# Patient Record
Sex: FEMALE | Race: BLACK OR AFRICAN AMERICAN | Employment: FULL TIME | URBAN - METROPOLITAN AREA
[De-identification: names, ages, dates, MRNs, and addresses within clinical notes are randomized per-mention and may not be internally consistent; named-entity substitution may affect disease eponyms.]

---

## 2017-08-21 RX ORDER — OMEPRAZOLE 20 MG/1
CAPSULE, DELAYED RELEASE ORAL
Qty: 30 CAP | Refills: 0 | Status: SHIPPED | OUTPATIENT
Start: 2017-08-21 | End: 2019-10-17

## 2017-08-22 NOTE — TELEPHONE ENCOUNTER
Received omeprazole refill request from pharmacy for pt who is 3.3y s/p sleeve. Was last seen in office @ her 2.5y check on 10/24/16. No future appts on our schedule. PLAN:  Will refill this one time. Will have staff contact pt to make OV for this fall.

## 2018-08-16 ENCOUNTER — DOCUMENTATION ONLY (OUTPATIENT)
Dept: SURGERY | Age: 53
End: 2018-08-16

## 2018-10-03 ENCOUNTER — OFFICE VISIT (OUTPATIENT)
Dept: SURGERY | Age: 53
End: 2018-10-03

## 2018-10-03 VITALS
DIASTOLIC BLOOD PRESSURE: 80 MMHG | HEIGHT: 63 IN | TEMPERATURE: 98 F | SYSTOLIC BLOOD PRESSURE: 130 MMHG | OXYGEN SATURATION: 99 % | BODY MASS INDEX: 34.98 KG/M2 | WEIGHT: 197.4 LBS | RESPIRATION RATE: 16 BRPM | HEART RATE: 66 BPM

## 2018-10-03 DIAGNOSIS — K90.9 INTESTINAL MALABSORPTION, UNSPECIFIED TYPE: ICD-10-CM

## 2018-10-03 DIAGNOSIS — Z98.84 S/P BARIATRIC SURGERY: ICD-10-CM

## 2018-10-03 DIAGNOSIS — R63.5 WEIGHT GAIN: Primary | ICD-10-CM

## 2018-10-03 RX ORDER — TOPIRAMATE 50 MG/1
TABLET, FILM COATED ORAL
Refills: 0 | COMMUNITY
Start: 2018-09-17

## 2018-10-03 RX ORDER — TRAZODONE HYDROCHLORIDE 50 MG/1
TABLET ORAL
Refills: 0 | COMMUNITY
Start: 2018-09-17

## 2018-10-03 NOTE — MR AVS SNAPSHOT
Rigoberto Pock 
 
 
 [address] 
[phone] Patient: Leandro Andrews MRN: [id number] :1965 Visit Information Date & Time Provider Department Dept. Phone Encounter #  
 10/3/2018 10:15 AM MD Julee Stuart [facility] [place] [phone] Your Appointments 10/3/2019  9:30 AM  
Office Visit with TOMA Love [facility] [place] ([address]) Appt Note: Yearly [address] [facility] [address] [place] [address]  
  
   
 [address] [place] [address] Upcoming Health Maintenance Date Due Hepatitis C Screening 1965 Pneumococcal 19-64 Medium Risk (1 of 1 - PPSV23) 12/3/1982 DTaP/Tdap/Td series (1 - Tdap) 12/3/1984 Shingrix Vaccine Age 50> (1 of 2) 12/3/2013 FOBT Q 1 YEAR AGE 50-75 12/3/2013 BREAST CANCER SCRN MAMMOGRAM 1/10/2015 PAP AKA CERVICAL CYTOLOGY 1/10/2016 Influenza Age 5 to Adult 2018 Allergies as of 10/3/2018  Review Complete On: 10/24/2016 By: Demi Garcia MD  
 No Known Allergies Current Immunizations  Never Reviewed No immunizations on file. Not reviewed this visit You Were Diagnosed With   
  
 Codes Comments Weight gain    -  Primary ICD-10-CM: R63.5 ICD-9-CM: 783.1 Intestinal malabsorption, unspecified type     ICD-10-CM: K90.9 ICD-9-CM: 579.9 S/P bariatric surgery     ICD-10-CM: Z98.84 ICD-9-CM: V45.86 Vitals BP Pulse Temp Resp Height(growth percentile) Weight(growth percentile) 130/80 (BP 1 Location: Right arm, BP Patient Position: Sitting) 66 98 °F (36.7 °C) 16 5' 3\" (1.6 m) 197 lb 6.4 oz (89.5 kg) SpO2 BMI OB Status Smoking Status 99% 34.97 kg/m2 Postmenopausal Never Smoker Vitals History BMI and BSA Data Body Mass Index Body Surface Area 34.97 kg/m 2 1.99 m 2 Preferred Pharmacy Pharmacy Name Phone [address] [facility], [address] [facility] [phone] Your Updated Medication List  
  
   
This list is accurate as of 10/3/18 11:01 AM.  Always use your most recent med list.  
  
  
  
  
 b complex vitamins tablet Take 1 Tab by mouth daily. Biotin 2,500 mcg Cap Take  by mouth. CALCIUM CITRATE PO Take  by mouth. docusate sodium 100 mg capsule Commonly known as:  Lender Comes Take 100 mg by mouth daily. multivitamin with iron chewable tablet Commonly known as:  Faylene Lily Take 1 Tab by mouth daily. * omeprazole 40 mg capsule Commonly known as:  PRILOSEC  
TAKE 1 CAPSULE BY MOUTH DAILY  
  
 * omeprazole 20 mg capsule Commonly known as:  PRILOSEC  
TAKE ONE CAPSULE BY MOUTH EVERY DAY  
  
 PROAIR HFA 90 mcg/actuation inhaler Generic drug:  albuterol Take 2 Puffs by inhalation every four (4) hours as needed. topiramate 50 mg tablet Commonly known as:  TOPAMAX TK 1 T PO  QD  
  
 traZODone 50 mg tablet Commonly known as:  DESYREL  
TK 1 T PO  QD  
  
 VITAMIN B-12 1,000 mcg sublingual tablet Generic drug:  cyanocobalamin Take 1,000 mcg by mouth daily. VITAMIN D3 PO Take  by mouth. * Notice: This list has 2 medication(s) that are the same as other medications prescribed for you. Read the directions carefully, and ask your doctor or other care provider to review them with you. To-Do List   
 10/03/2018 Lab:  CBC WITH AUTOMATED DIFF   
  
 10/03/2018 Lab:  FERRITIN   
  
 10/03/2018 Lab:  FOLATE   
  
 10/03/2018 Lab:  IRON   
  
 10/03/2018 Lab:  METABOLIC PANEL, COMPREHENSIVE   
  
 10/03/2018 Lab:  TSH 3RD GENERATION   
  
 10/03/2018 Lab:  VITAMIN B1, WHOLE BLOOD   
  
 10/03/2018 Lab:  VITAMIN B12   
  
 10/03/2018 Lab:  VITAMIN D, 25 HYDROXY Patient Instructions Body Mass Index: Care Instructions Your Care Instructions Body mass index (BMI) can help you see if your weight is raising your risk for health problems. It uses a formula to compare how much you weigh with how tall you are. · A BMI lower than 18.5 is considered underweight. · A BMI between 18.5 and 24.9 is considered healthy. · A BMI between 25 and 29.9 is considered overweight. A BMI of 30 or higher is considered obese. If your BMI is in the normal range, it means that you have a lower risk for weight-related health problems. If your BMI is in the overweight or obese range, you may be at increased risk for weight-related health problems, such as high blood pressure, heart disease, stroke, arthritis or joint pain, and diabetes. If your BMI is in the underweight range, you may be at increased risk for health problems such as fatigue, lower protection (immunity) against illness, muscle loss, bone loss, hair loss, and hormone problems. BMI is just one measure of your risk for weight-related health problems. You may be at higher risk for health problems if you are not active, you eat an unhealthy diet, or you drink too much alcohol or use tobacco products. Follow-up care is a key part of your treatment and safety. Be sure to make and go to all appointments, and call your doctor if you are having problems. It's also a good idea to know your test results and keep a list of the medicines you take. How can you care for yourself at home? · Practice healthy eating habits. This includes eating plenty of fruits, vegetables, whole grains, lean protein, and low-fat dairy. · If your doctor recommends it, get more exercise. Walking is a good choice. Bit by bit, increase the amount you walk every day. Try for at least 30 minutes on most days of the week. · Do not smoke. Smoking can increase your risk for health problems.  If you need help quitting, talk to your doctor about stop-smoking programs and medicines. These can increase your chances of quitting for good. · Limit alcohol to 2 drinks a day for men and 1 drink a day for women. Too much alcohol can cause health problems. If you have a BMI higher than 25 · Your doctor may do other tests to check your risk for weight-related health problems. This may include measuring the distance around your waist. A waist measurement of more than 40 inches in men or 35 inches in women can increase the risk of weight-related health problems. · Talk with your doctor about steps you can take to stay healthy or improve your health. You may need to make lifestyle changes to lose weight and stay healthy, such as changing your diet and getting regular exercise. If you have a BMI lower than 18.5 · Your doctor may do other tests to check your risk for health problems. · Talk with your doctor about steps you can take to stay healthy or improve your health. You may need to make lifestyle changes to gain or maintain weight and stay healthy, such as getting more healthy foods in your diet and doing exercises to build muscle. Where can you learn more? Go to http://makenna-rafat.info/. Enter S176 in the search box to learn more about \"Body Mass Index: Care Instructions. \" Current as of: October 9, 2017 Content Version: 11.7 © 1421-6770 [employer]. Care instructions adapted under license by [employer] (which disclaims liability or warranty for this information). If you have questions about a medical condition or this instruction, always ask your healthcare professional. [place] [address] any warranty or liability for your use of this information. Introducing [facility]! [facility] introduces Digital Caddies patient portal. Now you can access parts of your medical record, email your doctor's office, and request medication refills online. 1. In your internet browser, go to [email or web address] 2. Click on the First Time User? Click Here link in the Sign In box. You will see the New Member Sign Up page. 3. Enter your [facility] Access Code exactly as it appears below. You will not need to use this code after youve completed the sign-up process. If you do not sign up before the expiration date, you must request a new code. · [facility] Access Code: [email or web address] Expires: 1/1/2019 10:42 AM 
 
4. Enter the last four digits of your Social Security Number (xxxx) and Date of Birth (mm/dd/yyyy) as indicated and click Submit. You will be taken to the next sign-up page. 5. Create a HouzeMet ID. This will be your [facility] login ID and cannot be changed, so think of one that is secure and easy to remember. 6. Create a [facility] password. You can change your password at any time. 7. Enter your Password Reset Question and Answer. This can be used at a later time if you forget your password. 8. Enter your e-mail address. You will receive e-mail notification when new information is available in [address] 9. Click Sign Up. You can now view and download portions of your medical record. 10. Click the Download Summary menu link to download a portable copy of your medical information. If you have questions, please visit the Frequently Asked Questions section of the [facility] website. Remember, [facility] is NOT to be used for urgent needs. For medical emergencies, dial 911. Now available from your iPhone and Android! Please provide this summary of care documentation to your next provider. Your primary care clinician is listed as Nguyen Law. If you have any questions after today's visit, please call [phone].

## 2018-10-03 NOTE — PROGRESS NOTES
4.5 years follow-up Subjective: Leoma Leventhal  is a 45 y.o. female who presents for follow-up about 4.5 years following laparoscopic sleeve gastrectomy. She has lost a total of 38 pounds since surgery. Body mass index is 34.97 kg/(m^2). . EBWL is (35%). The patient presents today to assess their progress toward their goal of weight loss and to address any issues that may be present. Today the patient and I have reviewed their diet and how appropriate their food choices are. The following issues have been identified  - 14 lbs regained over the past 2 years. Pain assessment - 0/10 Ayse Ricks Surgery related complication: NA She reports no real issues and denies vomiting, abdominal pain, diarrhea and difficulty breathing. The patient's exercise level: not active. Changes in her medical history and medications have been reviewed. Patient Active Problem List  
Diagnosis Code  RAD (reactive airway disease) J45.909  Asthma J45.909  
 History of breast cancer Z85.3  Intestinal malabsorption K90.9  
 H. pylori infection A04.8  Chronic gastritis K29.50  S/P bariatric surgery Z98.84 Past Medical History:  
Diagnosis Date  Asthma  Cancer ([address] breast  
 H. pylori infection   
 treated  History of breast cancer 2009  
 left with subsequent mastectomy-2009  Hypertension 6yrs  Morbid obesity ([address]  Nausea & vomiting  Status post bariatric surgery  Unspecified intestinal malabsorption Past Surgical History:  
Procedure Laterality Date  ABDOMEN SURGERY PROC UNLISTED    
 liposuction  BREAST SURGERY PROCEDURE UNLISTED    
 left mastectomy /abdominoplasty, and reconstruction  HX BREAST BIOPSY    
 multiple  HX BREAST RECONSTRUCTION    
 HX GYN    
 BTL  
 HX LIPOSUCTION    
 HX OTHER SURGICAL    
 multiple biopsies for breast cancer  MT LAP, MACY RESTRICT PROC, LONGITUDINAL GASTRECTOMY    
 sleeve resection Current Outpatient Prescriptions Medication Sig Dispense Refill  topiramate (TOPAMAX) 50 mg tablet TK 1 T PO  QD  0  
 traZODone (DESYREL) 50 mg tablet TK 1 T PO  QD  0  
 omeprazole (PRILOSEC) 20 mg capsule TAKE ONE CAPSULE BY MOUTH EVERY DAY 30 Cap 0  
 omeprazole (PRILOSEC) 40 mg capsule TAKE 1 CAPSULE BY MOUTH DAILY 30 Cap 0  
 docusate sodium (COLACE) 100 mg capsule Take 100 mg by mouth daily.  b complex vitamins tablet Take 1 Tab by mouth daily.  CALCIUM CITRATE PO Take  by mouth.  CHOLECALCIFEROL, VITAMIN D3, (VITAMIN D3 PO) Take  by mouth.  cyanocobalamin (VITAMIN B-12) 1,000 mcg sublingual tablet Take 1,000 mcg by mouth daily.  Biotin 2,500 mcg cap Take  by mouth.  multivitamin with iron (FLINTSTONES) chewable tablet Take 1 Tab by mouth daily.  albuterol (PROAIR HFA) 90 mcg/actuation inhaler Take 2 Puffs by inhalation every four (4) hours as needed. Review of Symptoms:  
 
General - No history or complaints of unexpected fever or chills Head/Neck - No history or complaints of headache or dizziness Cardiac - No history or complaints of chest pain, palpitations, or shortness of breath Pulmonary - No history or complaints of shortness of breath or productive cough Gastrointestinal - as noted above Genitourinary - No history or complaints of hematuria/dysuria or renal lithiasis Musculoskeletal - No history or complaints of joint  muscular weakness Hematologic - No history of any bleeding episodes Neurologic - No history or complaints of  migraine headaches or neurologic symptoms Objective:  
 
Visit Vitals  /80 (BP 1 Location: Right arm, BP Patient Position: Sitting)  Pulse 66  Temp 98 °F (36.7 °C)  Resp 16  
 Ht 5' 3\" (1.6 m)  Wt 89.5 kg (197 lb 6.4 oz)  SpO2 99%  BMI 34.97 kg/m2 Physical Exam: 
 
General:  alert, cooperative, no distress, appears stated age Lungs:   clear to auscultation bilaterally Heart:  Regular rate and rhythm Abdomen:   abdomen is soft without significant tenderness, masses, organomegaly or guarding; Incisions: healing well, no significant drainage Labs:  
 
No results found for this or any previous visit (from the past 2016 hour(s)). Assessment:  
 
1. History of Morbid obesity, status post  laparoscopic sleeve gastrectomy. The patient is making some dietary mistakes and is eating too close to bedtime. We had a long discussion today about sleeve resection and vitamin needs. He will consider meeting with dietary staff in an effort to better control carb intake. The patient's exercise also needs improvement. She does describe adequate portion size with her sleeve. TSH ordered today Plan: 1. Remember to measure portions, continue low carbohydrate diet 2. Reviewed labs and appropriate changes made to vitamin regimen 3. Remember vitamin supplements. 4. Exercise regimen appears inadequate. 5. Attend support group 6. Follow-up in 1 year(s). 7. The patient understands the plan of action 8. Total time spent with the patient 30 minutes.

## 2018-10-03 NOTE — PATIENT INSTRUCTIONS
Body Mass Index: Care Instructions Your Care Instructions Body mass index (BMI) can help you see if your weight is raising your risk for health problems. It uses a formula to compare how much you weigh with how tall you are. · A BMI lower than 18.5 is considered underweight. · A BMI between 18.5 and 24.9 is considered healthy. · A BMI between 25 and 29.9 is considered overweight. A BMI of 30 or higher is considered obese. If your BMI is in the normal range, it means that you have a lower risk for weight-related health problems. If your BMI is in the overweight or obese range, you may be at increased risk for weight-related health problems, such as high blood pressure, heart disease, stroke, arthritis or joint pain, and diabetes. If your BMI is in the underweight range, you may be at increased risk for health problems such as fatigue, lower protection (immunity) against illness, muscle loss, bone loss, hair loss, and hormone problems. BMI is just one measure of your risk for weight-related health problems. You may be at higher risk for health problems if you are not active, you eat an unhealthy diet, or you drink too much alcohol or use tobacco products. Follow-up care is a key part of your treatment and safety. Be sure to make and go to all appointments, and call your doctor if you are having problems. It's also a good idea to know your test results and keep a list of the medicines you take. How can you care for yourself at home? · Practice healthy eating habits. This includes eating plenty of fruits, vegetables, whole grains, lean protein, and low-fat dairy. · If your doctor recommends it, get more exercise. Walking is a good choice. Bit by bit, increase the amount you walk every day. Try for at least 30 minutes on most days of the week. · Do not smoke. Smoking can increase your risk for health problems.  If you need help quitting, talk to your doctor about stop-smoking programs and medicines. These can increase your chances of quitting for good. · Limit alcohol to 2 drinks a day for men and 1 drink a day for women. Too much alcohol can cause health problems. If you have a BMI higher than 25 · Your doctor may do other tests to check your risk for weight-related health problems. This may include measuring the distance around your waist. A waist measurement of more than 40 inches in men or 35 inches in women can increase the risk of weight-related health problems. · Talk with your doctor about steps you can take to stay healthy or improve your health. You may need to make lifestyle changes to lose weight and stay healthy, such as changing your diet and getting regular exercise. If you have a BMI lower than 18.5 · Your doctor may do other tests to check your risk for health problems. · Talk with your doctor about steps you can take to stay healthy or improve your health. You may need to make lifestyle changes to gain or maintain weight and stay healthy, such as getting more healthy foods in your diet and doing exercises to build muscle. Where can you learn more? Go to http://makenna-rafat.info/. Enter S176 in the search box to learn more about \"Body Mass Index: Care Instructions. \" Current as of: October 9, 2017 Content Version: 11.7 © 9320-5866 [employer], Incorporated. Care instructions adapted under license by [employer] (which disclaims liability or warranty for this information). If you have questions about a medical condition or this instruction, always ask your healthcare professional. [place] [address] any warranty or liability for your use of this information. Patient Instructions 1. Continue to monitor carbohydrate and protein intake- remember to keep your           total  carbohydrates to 50 grams or less per day for best results. 2. Remember hydration goals - usually 48 to 64 ounces of liquids per day 3. Continue to work towards exercise goals - minimum 3 days per week of 45          minutes to  1 hour at a time. 4. Remember to take vitamins as directed Supplement Resource Guide Importance of Protein:  
Maintains lean body mass, produces antibodies to fight off infections, heals wounds, minimizes hair loss, helps to give you energy, helps with satiety, and keeping you full between meals. Importance of Calcium: 
Needed for healthy bones and teeth, normal blood clotting, and nervous system functioning, higher risk of osteoporosis and bone disease with non-compliance. Importance of Multivitamins: Many functions. Supply you with extra nutrients that you may be missing from food. May lead to iron deficiency anemia, weakness, fatigue, and many other symptoms with non-compliance. Importance of B Vitamins: 
Important for red blood cell formation, metabolism, energy, and helps to maintain a healthy nervous system. Protein Supplement Find one you like now. Use immediately after surgery. Look for: 
35-50g protein each day from your protein supplement once you reach the progression diet. 0-3 g fat per serving 0-3 g sugar per serving Protein drinks should be split in separate dosages. Recommend: Lifelong 1 year + Calcium Supplement:  
 
Start taking within a month after surgery. Look for: Calcium Citrate Plus D (1500 mg per day) Recommend: Citracal 
 
 . Avoid chocolate chewable calcium. Can use chewable bariatric or [employer] brand or similar chewable. The body cannot absorb more than 500-600 mg @ a time. Take for Life Multi-vitamin Supplement:   
 
1st Month After Surgery: Any complete chewable, such as: Walnuts Complete chewables. Avoid Walnut sours or gummies. They lack iron and other important nutrients and also have added sugar. Continue with chewable vitamin or change to adult complete multivitamin one month after surgery. Menstruating women can take a prenatal vitamin. Make sure has at least 18 mg iron and 841-147 mcg folic acid): Vitamin B12, B Complex Vitamin, and Biotin Start taking within a month after surgery. Vitamin B12:  1000 mcg of Vitamin B12 three times weekly Must take sublingually (meaning you take it under your tongue) or in a liquid drop form for easy absorption. B Complex Vitamin: Take a pill or liquid drop form once daily. Biotin: This vitamin can help prevent hair loss. Recommend 5mg  
(5000 mcg) a day Biotin is Optional

## 2019-10-17 ENCOUNTER — OFFICE VISIT (OUTPATIENT)
Dept: SURGERY | Age: 54
End: 2019-10-17

## 2019-10-17 VITALS
BODY MASS INDEX: 37.03 KG/M2 | WEIGHT: 209 LBS | HEIGHT: 63 IN | TEMPERATURE: 97.6 F | DIASTOLIC BLOOD PRESSURE: 74 MMHG | SYSTOLIC BLOOD PRESSURE: 127 MMHG | HEART RATE: 60 BPM | OXYGEN SATURATION: 100 %

## 2019-10-17 DIAGNOSIS — Z98.84 S/P BARIATRIC SURGERY: ICD-10-CM

## 2019-10-17 DIAGNOSIS — K90.9 INTESTINAL MALABSORPTION, UNSPECIFIED TYPE: Primary | ICD-10-CM

## 2019-10-17 DIAGNOSIS — E66.01 SEVERE OBESITY (HCC): ICD-10-CM

## 2019-10-17 DIAGNOSIS — K21.9 GASTROESOPHAGEAL REFLUX DISEASE WITHOUT ESOPHAGITIS: ICD-10-CM

## 2019-10-17 RX ORDER — FUROSEMIDE 20 MG/1
TABLET ORAL DAILY
COMMUNITY

## 2019-10-17 RX ORDER — FAMOTIDINE 40 MG/1
40 TABLET, FILM COATED ORAL DAILY
Qty: 30 TAB | Refills: 6 | Status: SHIPPED | OUTPATIENT
Start: 2019-10-17

## 2019-10-17 NOTE — PROGRESS NOTES
Subjective: Nabeel Musa is a 52 y.o. female is now 5.5 years status post laparoscopic sleeve gastrectomy. Doing well overall. She has lost a total of 26 pounds since surgery. Body mass index is 37.02 kg/m². Has lost 24% of EBW. Currently on a solid food diet without difficulty, reports no issues and denies vomiting and abdominal pain. Taking in an unknown amount of fluids daily. Sources of protein include meats. She has been eating chocolate approximately 3 times a week. 30 min of activity 2 days a week, including walking. Patient is sleeping 7  hours a night on average. Patient does have intermittent GERD symptoms. Bowel movements are regular. The patient is not having any pain. . The patient is compliant with multivitamins, calcium, Vit D and B12 supplements.      Weight Loss Metrics 10/17/2019 10/3/2018 10/24/2016 4/26/2016 10/6/2015 4/2/2015 1/6/2015   Pre op / Initial Wt - - - - - - -   Today's Wt 209 lb 197 lb 6.4 oz 183 lb 191 lb 186 lb 183 lb 185 lb   BMI 37.02 kg/m2 34.97 kg/m2 32.42 kg/m2 33.84 kg/m2 32.96 kg/m2 32.43 kg/m2 32.78 kg/m2   Ideal Body Wt - - - - - - -   Excess Body Wt - - - - - - -   Goal Wt - - - - - - -   Wt loss to date - - - - - - -   % Wt Loss - - - - - - -   80% EBW - - - - - - -          Patient Active Problem List   Diagnosis Code    RAD (reactive airway disease) J45.909    Asthma J45.909    History of breast cancer Z85.3    Intestinal malabsorption K90.9    H. pylori infection A04.8    Chronic gastritis K29.50    S/P bariatric surgery Z98.84    Severe obesity (HCC) E66.01        Past Medical History:   Diagnosis Date    Asthma     Cancer ([facility]     breast    H. pylori infection     treated    History of breast cancer 2009    left with subsequent mastectomy-2009    Hypertension 6yrs    Morbid obesity ([address]     Nausea & vomiting     Status post bariatric surgery     Unspecified intestinal malabsorption        Past Surgical History:   Procedure Laterality Date    ABDOMEN SURGERY PROC UNLISTED      liposuction    BREAST SURGERY PROCEDURE UNLISTED      left mastectomy /abdominoplasty, and reconstruction    HX BREAST BIOPSY      multiple    HX BREAST RECONSTRUCTION      HX GYN      BTL    HX LIPOSUCTION      HX OTHER SURGICAL      multiple biopsies for breast cancer    NV LAP, MACY RESTRICT PROC, LONGITUDINAL GASTRECTOMY      sleeve resection       Current Outpatient Medications   Medication Sig Dispense Refill    furosemide (LASIX) 20 mg tablet Take  by mouth daily.  famotidine (PEPCID) 40 mg tablet Take 1 Tab by mouth daily. 30 Tab 6    topiramate (TOPAMAX) 50 mg tablet TK 1 T PO  QD  0    traZODone (DESYREL) 50 mg tablet TK 1 T PO  QD  0    b complex vitamins tablet Take 1 Tab by mouth daily.  CALCIUM CITRATE PO Take  by mouth.  CHOLECALCIFEROL, VITAMIN D3, (VITAMIN D3 PO) Take  by mouth.  cyanocobalamin (VITAMIN B-12) 1,000 mcg sublingual tablet Take 1,000 mcg by mouth daily.  multivitamin with iron (FLINTSTONES) chewable tablet Take 1 Tab by mouth daily.  albuterol (PROAIR HFA) 90 mcg/actuation inhaler Take 2 Puffs by inhalation every four (4) hours as needed.          No Known Allergies    Review of Systems:  General - No history or complaints of unexpected fever or chills  Head/Neck - No history or complaints of headache or dizziness  Cardiac - No history or complaints of chest pain, palpitations, or shortness of breath  Pulmonary - No history or complaints of shortness of breath or productive cough  Gastrointestinal - as noted above  Genitourinary - No history or complaints of hematuria/dysuria or renal lithiasis  Musculoskeletal - No history or complaints of joint  muscular weakness  Hematologic - No history of any bleeding episodes  Neurologic - No history or complaints of  migraine headaches or neurologic symptoms    Objective:     Visit Vitals  /74 (BP 1 Location: Left arm, BP Patient Position: Sitting)   Pulse 60   Temp 97.6 °F (36.4 °C)   Ht 5' 3\" (1.6 m)   Wt 94.8 kg (209 lb)   SpO2 100%   BMI 37.02 kg/m²       General:  alert, cooperative, no distress, appears stated age   Chest: lungs clear to auscultation, breath sounds equal and symmetric, no rhonchi, rales or wheezes, no accessory muscle use   Cor:   Regular rate and rhythm or without murmur or extra heart sounds   Abdomen: soft, bowel sounds active, non-tender, no masses or organomegaly   Incisions:   healed well       Assessment:   History of Morbid obesity, status post laparoscopic sleeve gastrectomy. Doing well postoperatively. Strict diet control, patient is to keep carbohydrate consumption <100g daily for additional weight loss. I reminded patient that dietitian is always a free resource for her. Exercise a minimum of 30 minutes daily. Increase fluid intake to >64oz daily or more of sugar free and caffeine free fluids. Asthma - Continue meds and follow up with PCP  Insomnia - Continue meds and follow up with PCP  Migraines - Continue meds and follow up with PCP or neurology  GERD - Pepcid prn  Plan:     1. Increase activity to the goal of 30 minutes daily  2. Discussed patients weight loss goals and dietary choices in relation to goals. 3. Sleep goal is 7-9 hours each night. Patient education given on the effects of sleep deprivation on weight control. 4. Reminded to measure portions, continue high protein, low carbohydrate diet. Reminded to eat regularly, to eat slowly & not to drink with meals. 5. Continue vitamin supplementation  6. Continue current medications and follow up with PCP for management of regimen. 7. Continue cardio exercise and add resistance exercises. 60-90 minutes of aerobic activity 5 days a week and strength training 2 days each week. 8. Encouraged to attend support group   9. Lab slip given today. 10. I have discussed this plan with patient and they verbalized understanding  11.  Follow up in 6 months or sooner if patient has questions, concerns or worsening of condition, if unable to reach our office, patient should report to the ED. 15. Ms. Abimael Goyal has a reminder for a \"due or due soon\" health maintenance. I have asked that she contact her primary care provider for a follow-up on this health maintenance.

## 2019-10-17 NOTE — PROGRESS NOTES
Dayanaradwayne Seguraer presents today for   Chief Complaint   Patient presents with    Follow-up     Pt is here today for her follow up       Is someone accompanying this pt? yes    Is the patient using any DME equipment during OV? no    Depression Screening:  3 most recent PHQ Screens 10/17/2019   Little interest or pleasure in doing things Not at all   Feeling down, depressed, irritable, or hopeless Not at all   Total Score PHQ 2 0       Learning Assessment:  Learning Assessment 10/17/2019   PRIMARY LEARNER Patient   HIGHEST LEVEL OF EDUCATION - PRIMARY LEARNER  GRADUATED HIGH SCHOOL OR GED   BARRIERS PRIMARY LEARNER NONE   CO-LEARNER CAREGIVER No   PRIMARY LANGUAGE ENGLISH    NEED No   LEARNER PREFERENCE PRIMARY DEMONSTRATION   LEARNING SPECIAL TOPICS no   ANSWERED BY patient   RELATIONSHIP SELF       Abuse Screening:  Abuse Screening Questionnaire 10/17/2019   Do you ever feel afraid of your partner? N   Are you in a relationship with someone who physically or mentally threatens you? N   Is it safe for you to go home? Y       Fall Risk  No flowsheet data found. Coordination of Care:  1. Have you been to the ER, urgent care clinic since your last visit? Hospitalized since your last visit? no    2. Have you seen or consulted any other health care providers outside of the [address] [place] [address] since your last visit? Include any pap smears or colon screening.  no

## 2019-10-17 NOTE — PATIENT INSTRUCTIONS
Patient Instructions 1. Remember hydration goals - minimum of 64 ounces of liquids per day (dehydration is the number one reason for hospital readmission). 2. Sleep 7-9 hours each night to keep your metabolism up. 3. Continue to monitor carbohydrate and protein intake you need a minimum of  Grams of protein daily- remember to keep your total carbohydrates to 50 grams or less per day for best results. 4. To maximize weight loss keep your caloric intake between 800-1,200 calories daily. If you are exercising excessively, such as training for a marathon, you need to keep a food log and meet with the dietician so they can advise you on your diet choices, carbohydrate intake and caloric intake. 5. Continue to work towards exercise goals - 60-90 minutes, 5 times a week minimum of deliberate, aerobic exercise is the ultimate goal with strength training 2 times each week. Refer to [employer] for  information. 6. Remember to take vitamins as directed in your handbook. 7. Attend support group the 2nd Thursday of each month. 8. Constipation: Milk of Magnesia is for immediate relief only. Miralax is to be used every day if constipation is a chronic problem. 9. Diarrhea: patients will occasionally develop lactose intolerance after surgery. Check to see if your protein shake has whey in it. If it does try a protein powder or drink that does not have whey and stop all yogurts, cheeses and milks to see if the diarrhea goes away. 10. If you have had labs drawn. We will only call you if you have abnormal results. Otherwise you can access the lab results in \"[employer]\". You will only need the access code the first time you sign on. 11. Call us at [phone] or email us through [email or web address]" with questions,     concerns or worsening of condition, we have someone on call 24 hours a day.   If you are unable to reach our office, you are to go to your Primary Care Physician or the Emergency Department. NOTE TO GASTRIC BYPASS PATIENTS:  (SAME APPLIES TO GASTRIC SLEEVE PATIENTS FOR FIRST TWO MONTHS) Remember that for the rest of your life, you are not able to take the following: 
- NSAIDs (ibuprofen, goody powder, BC powder, Motrin, Advil, Mobic, Voltaren, Excedrin, etc.) - Steroid pills or injections - Smoke (cigarettes or recreational drugs) - Alcohol Use of any of the above may cause ulcers in your stomach which may perforate causing a medical emergency and surgery. Speak to our medical staff if another medical provider requires you to take steroids or NSAIDs. Supplement Resource Guide Importance of Protein:  
Maintains lean body mass, produces antibodies to fight off infections, heals wounds, minimizes hair loss, helps to give you energy, helps with satiety, and keeping you full between meals. Importance of Calcium: 
Needed for healthy bones and teeth, normal blood clotting, and nervous system functioning, higher risk of osteoporosis and bone disease with non-compliance. Importance of Multivitamins: Many functions. Supply you with extra nutrients that you may be missing from food. May lead to iron deficiency anemia, weakness, fatigue, and many other symptoms with non-compliance. Importance of B Vitamins: 
Important for red blood cell formation, metabolism, energy, and helps to maintain a healthy nervous system. Protein Supplement Liquid diet phase: consume 90-100g protein daily. Once you are eating consume 35-50g protein each day from your protein supplement. 0-3 g fat per serving 0-3 g sugar per serving The body can only absorb 30g of protein at one time, so do not consume more than that at one time. Multi-vitamin Supplement:   
Start immediately after surgery: any complete chewable, such as: [place]s Complete chewables. Avoid [place] sours or gummies.   They lack iron and other important nutrients and also have added sugar. Continue with a chewable vitamin or change to an adult complete multivitamin one month after surgery. Menstruating women can take a prenatal vitamin. Make sure it has at least 18 mg iron and 180-046 mcg folic acid Calcium Supplement:  
 
Start taking within one month after surgery. Look for:  
Calcium Citrate Plus D (1500 mg per day) Recommend: Citracal 
 
Avoid chocolate chewable calcium. Can use chewable bariatric or [employer] brand or similar chewable. The body cannot absorb more than 500-600 mg of calcium at one time. Take for Life Vitamin D Take 3,000 international units daily Vitamin B12 B Complex Vitamin Start taking both within one month after surgery. Vitamin B12 (sublingual): Take 1000 mcg of Vitamin B12 three times weekly Must take sublingually (meaning you put it under your tongue) or in a liquid drop form for easy absorption. B Complex Vitamin:  
Take one pill daily or liquid drop form daily; as directed on bottle. Take for Life [employer] with protein powder

## 2021-03-16 ENCOUNTER — DOCUMENTATION ONLY (OUTPATIENT)
Dept: SURGERY | Age: 56
End: 2021-03-16

## 2021-03-16 NOTE — PROGRESS NOTES
Per MBSAQ requirements:  Letter sent requesting follow up appointment.    [facility]  [address] [facility] [address]   [place] [address]                 [facility]  [facility]  Critical access hospital      Dear Patient,    Your health is our main concern. It is important for your health to have follow-up lab work and to see your surgeon at 2 months, 4 months, 6 months, 9 months and annually after your weight loss surgery.          Additionally, the Department of Bariatric Surgery at our hospital is a member of the American College of Surgeons’ Bariatric National Surgical Quality Improvement Program (ACS NSQIP).   As a participant in this program, we gather information on the outcomes of our patients after surgery.  Please call the office for a follow up appointment at [phone].  If you have moved out of the area or have changed surgeons please call us and let us know the name of your doctor.    Your health and feedback are important to us.  We greatly appreciate your response.       Thank you,  [facility]  [place]

## 2021-04-23 ENCOUNTER — DOCUMENTATION ONLY (OUTPATIENT)
Dept: OTHER | Age: 56
End: 2021-04-23

## 2021-04-23 NOTE — PROGRESS NOTES
Per [facility] requirements:  eMAIL sent requesting follow up appointment. [employer] Surgical Specialist  [address]   [address] [place], [address] [facility] [place]  [facility]  [facility]      Dear Patient,    Your health is our main concern. It is important for your health to have follow-up lab work and to see your surgeon at 2 months, 4 months, 6 months, 9 months and annually after your weight loss surgery. Additionally, the Department of Bariatric Surgery at our hospital is a member of the [employer] of [address] [place] [address] Surgical Quality Improvement Program ([facility] NSQIP). As a participant in this program, we gather information on the outcomes of our patients after surgery. Please call the office for a follow up appointment at [phone]. If you have moved out of the area or have changed surgeons please call us and let us know the name of your doctor. Your health and feedback are important to us. We greatly appreciate your response.        Thank you,  [facility]

## 2021-07-09 ENCOUNTER — OFFICE VISIT (OUTPATIENT)
Dept: SURGERY | Age: 56
End: 2021-07-09

## 2021-07-09 VITALS
HEIGHT: 63 IN | OXYGEN SATURATION: 100 % | SYSTOLIC BLOOD PRESSURE: 128 MMHG | DIASTOLIC BLOOD PRESSURE: 66 MMHG | RESPIRATION RATE: 16 BRPM | WEIGHT: 218.5 LBS | HEART RATE: 81 BPM | BODY MASS INDEX: 38.71 KG/M2

## 2021-07-09 DIAGNOSIS — K90.9 INTESTINAL MALABSORPTION, UNSPECIFIED TYPE: Primary | ICD-10-CM

## 2021-07-09 PROCEDURE — 99214 OFFICE O/P EST MOD 30 MIN: CPT | Performed by: SPECIALIST

## 2021-07-09 NOTE — PATIENT INSTRUCTIONS

## 2021-07-09 NOTE — PROGRESS NOTES
7.25 years follow-up    Subjective: Patricia Pettit  is a 60 y.o. female who presents for follow-up about 7.25 years following laparoscopic sleeve gastrectomy. She has lost a total of 17 pounds since surgery. Body mass index is 38.71 kg/m². . EBWL is (15%). The patient presents today to assess their progress toward their goal of weight loss and to address any issues that may be present. Today the patient and I have reviewed their diet and how appropriate their food choices are. The following issues have been identified - she has regained 34 lbs from her lowest weight of 184 lbs. Aside from her weight regain she is having no issues with PO intake or reflux issues    Pain assessment - 0/10  . Surgery related complication: NA       She reports no real issue and denies vomiting, abdominal pain, diarrhea and difficulty breathing. The patient's exercise level: not active. Changes in her medical history and medications have been reviewed.     Patient Active Problem List   Diagnosis Code    RAD (reactive airway disease) J45.909    Asthma J45.909    History of breast cancer Z85.3    Intestinal malabsorption K90.9    H. pylori infection A04.8    Chronic gastritis K29.50    S/P bariatric surgery Z98.84    Severe obesity ([address] E66.01     Past Medical History:   Diagnosis Date    Asthma     Cancer ([address]     breast    H. pylori infection     treated    History of breast cancer 2009    left with subsequent mastectomy-2009    Hypertension 6yrs    Morbid obesity ([address]     Nausea & vomiting     Status post bariatric surgery     Unspecified intestinal malabsorption      Past Surgical History:   Procedure Laterality Date    HX BREAST BIOPSY      multiple    HX BREAST RECONSTRUCTION      HX GYN      BTL    HX LIPOSUCTION      HX OTHER SURGICAL      multiple biopsies for breast cancer    VT ABDOMEN SURGERY PROC UNLISTED      liposuction    VT BREAST SURGERY PROCEDURE UNLISTED      left mastectomy /abdominoplasty, and reconstruction    HI LAP, MACY RESTRICT PROC, LONGITUDINAL GASTRECTOMY      sleeve resection     Current Outpatient Medications   Medication Sig Dispense Refill    famotidine (PEPCID) 40 mg tablet Take 1 Tab by mouth daily. 30 Tab 6    b complex vitamins tablet Take 1 Tab by mouth daily.  CALCIUM CITRATE PO Take  by mouth.  CHOLECALCIFEROL, VITAMIN D3, (VITAMIN D3 PO) Take  by mouth.  cyanocobalamin (VITAMIN B-12) 1,000 mcg sublingual tablet Take 1,000 mcg by mouth daily.  multivitamin with iron (FLINTSTONES) chewable tablet Take 1 Tab by mouth daily.  albuterol (PROAIR HFA) 90 mcg/actuation inhaler Take 2 Puffs by inhalation every four (4) hours as needed.  furosemide (LASIX) 20 mg tablet Take  by mouth daily.  (Patient not taking: Reported on 7/9/2021)      topiramate (TOPAMAX) 50 mg tablet TK 1 T PO  QD (Patient not taking: Reported on 7/9/2021)  0    traZODone (DESYREL) 50 mg tablet TK 1 T PO  QD (Patient not taking: Reported on 7/9/2021)  0       Review of Symptoms:     General - No history or complaints of unexpected fever or chills  Head/Neck - No history or complaints of headache or dizziness  Cardiac - No history or complaints of chest pain, palpitations, or shortness of breath  Pulmonary - No history or complaints of shortness of breath or productive cough  Gastrointestinal - as noted above  Genitourinary - No history or complaints of hematuria/dysuria or renal lithiasis  Musculoskeletal - No history or complaints of joint  muscular weakness  Hematologic - No history of any bleeding episodes  Neurologic - No history or complaints of  migraine headaches or neurologic symptoms    Objective:     Visit Vitals  /66 (BP 1 Location: Left upper arm, BP Patient Position: Sitting, BP Cuff Size: Large adult)   Pulse 81   Resp 16   Ht 5' 3\" (1.6 m)   Wt 99.1 kg (218 lb 8 oz)   SpO2 100%   BMI 38.71 kg/m²        Physical Exam:    General:  alert, cooperative, no distress, appears stated age   Lungs:   clear to auscultation bilaterally   Heart:  Regular rate and rhythm   Abdomen:   abdomen is soft without significant tenderness, masses, organomegaly or guarding; Incisions: healing well, no significant drainage         Labs:     No results found for this or any previous visit (from the past 2016 hour(s)). Assessment:     1. History of Morbid obesity, status post  laparoscopic sleeve gastrectomy. The patient wishes to get to a goal weight of 170 lbs. She has been re-educated in diet and exercise needs. A recent PCP visit was unremarkable. Routine labs ordered today    Plan:     1. Remember to measure portions, continue low carbohydrate diet  2. Reviewed labs and appropriate changes made to vitamin regimen  3. Remember vitamin supplements. 4. Exercise regimen appears inadequate. 5. Attend support group  6. Follow-up in 6 month(s). 7. The patient understands the plan of action  8. Total time spent with the patient 30 minutes.

## 2021-08-03 PROBLEM — J45.909 ASTHMA: Status: RESOLVED | Noted: 2021-08-03 | Resolved: 2021-08-03

## 2022-03-19 PROBLEM — E66.01 SEVERE OBESITY (HCC): Status: ACTIVE | Noted: 2019-10-17

## 2023-02-27 RX ORDER — LISINOPRIL 20 MG/1
20 TABLET ORAL DAILY
COMMUNITY

## 2023-10-10 ENCOUNTER — OFFICE VISIT (OUTPATIENT)
Facility: CLINIC | Age: 58
End: 2023-10-10
Payer: COMMERCIAL

## 2023-10-10 VITALS
SYSTOLIC BLOOD PRESSURE: 150 MMHG | WEIGHT: 218 LBS | BODY MASS INDEX: 38.62 KG/M2 | HEART RATE: 67 BPM | TEMPERATURE: 97.9 F | HEIGHT: 63 IN | DIASTOLIC BLOOD PRESSURE: 88 MMHG | OXYGEN SATURATION: 98 % | RESPIRATION RATE: 18 BRPM

## 2023-10-10 DIAGNOSIS — J06.9 VIRAL URI: ICD-10-CM

## 2023-10-10 DIAGNOSIS — J45.21 MILD INTERMITTENT ASTHMA WITH EXACERBATION: Primary | ICD-10-CM

## 2023-10-10 PROCEDURE — 94640 AIRWAY INHALATION TREATMENT: CPT | Performed by: STUDENT IN AN ORGANIZED HEALTH CARE EDUCATION/TRAINING PROGRAM

## 2023-10-10 PROCEDURE — 99214 OFFICE O/P EST MOD 30 MIN: CPT | Performed by: STUDENT IN AN ORGANIZED HEALTH CARE EDUCATION/TRAINING PROGRAM

## 2023-10-10 RX ORDER — FLUTICASONE PROPIONATE 50 MCG
2 SPRAY, SUSPENSION (ML) NASAL DAILY
Qty: 16 G | Refills: 3 | Status: SHIPPED | OUTPATIENT
Start: 2023-10-10

## 2023-10-10 RX ORDER — ALBUTEROL SULFATE 2.5 MG/3ML
2.5 SOLUTION RESPIRATORY (INHALATION) ONCE
Status: COMPLETED | OUTPATIENT
Start: 2023-10-10 | End: 2023-10-10

## 2023-10-10 RX ORDER — ALBUTEROL SULFATE 90 UG/1
2 AEROSOL, METERED RESPIRATORY (INHALATION) EVERY 4 HOURS PRN
Qty: 18 G | Refills: 1 | Status: SHIPPED | OUTPATIENT
Start: 2023-10-10

## 2023-10-10 RX ORDER — PREDNISONE 20 MG/1
40 TABLET ORAL DAILY
Qty: 10 TABLET | Refills: 0 | Status: SHIPPED | OUTPATIENT
Start: 2023-10-10 | End: 2023-10-15

## 2023-10-10 RX ORDER — GUAIFENESIN 600 MG/1
1200 TABLET, EXTENDED RELEASE ORAL 2 TIMES DAILY
Qty: 28 TABLET | Refills: 0 | Status: SHIPPED | OUTPATIENT
Start: 2023-10-10 | End: 2023-10-17

## 2023-10-10 RX ORDER — BENZONATATE 100 MG/1
100 CAPSULE ORAL 2 TIMES DAILY PRN
Qty: 14 CAPSULE | Refills: 0 | Status: SHIPPED | OUTPATIENT
Start: 2023-10-10 | End: 2023-10-17

## 2023-10-10 RX ADMIN — ALBUTEROL SULFATE 2.5 MG: 2.5 SOLUTION RESPIRATORY (INHALATION) at 15:12

## 2023-10-10 SDOH — ECONOMIC STABILITY: HOUSING INSECURITY
IN THE LAST 12 MONTHS, WAS THERE A TIME WHEN YOU DID NOT HAVE A STEADY PLACE TO SLEEP OR SLEPT IN A SHELTER (INCLUDING NOW)?: NO

## 2023-10-10 SDOH — ECONOMIC STABILITY: FOOD INSECURITY: WITHIN THE PAST 12 MONTHS, YOU WORRIED THAT YOUR FOOD WOULD RUN OUT BEFORE YOU GOT MONEY TO BUY MORE.: NEVER TRUE

## 2023-10-10 SDOH — ECONOMIC STABILITY: INCOME INSECURITY: HOW HARD IS IT FOR YOU TO PAY FOR THE VERY BASICS LIKE FOOD, HOUSING, MEDICAL CARE, AND HEATING?: NOT HARD AT ALL

## 2023-10-10 SDOH — ECONOMIC STABILITY: FOOD INSECURITY: WITHIN THE PAST 12 MONTHS, THE FOOD YOU BOUGHT JUST DIDN'T LAST AND YOU DIDN'T HAVE MONEY TO GET MORE.: NEVER TRUE

## 2023-10-10 ASSESSMENT — PATIENT HEALTH QUESTIONNAIRE - PHQ9: DEPRESSION UNABLE TO ASSESS: PT REFUSES

## 2023-10-10 NOTE — PROGRESS NOTES
Sherman Boykin presents today for   Chief Complaint   Patient presents with    Cough     Patient reports having a cough and headaches, sinus pain since last Thursday. Took 2 COVID tests and they were negative. Is someone accompanying this pt? no    Is the patient using any DME equipment during OV? no    Health Maintenance reviewed and discussed and ordered per Provider. Health Maintenance Due   Topic Date Due    Hepatitis B vaccine (1 of 3 - 3-dose series) Never done    Depression Screen  Never done    HIV screen  Never done    Hepatitis C screen  Never done    DTaP/Tdap/Td vaccine (1 - Tdap) Never done    Cervical cancer screen  Never done    Diabetes screen  Never done    Lipids  Never done    Shingles vaccine (2 of 2) 01/28/2014    COVID-19 Vaccine (3 - Moderna series) 05/06/2021    Breast cancer screen  03/11/2022    Flu vaccine (1) 08/01/2023   . Coordination of Care:  1. \"Have you been to the ER, urgent care clinic since your last visit? Hospitalized since your last visit? \" No    2. \"Have you seen or consulted any other health care providers outside of the [address] [place] [address] since your last visit? \" No Valtrex Pregnancy And Lactation Text: this medication is Pregnancy Category B and is considered safe during pregnancy. This medication is not directly found in breast milk but it's metabolite acyclovir is present.

## 2023-10-18 ENCOUNTER — OFFICE VISIT (OUTPATIENT)
Facility: CLINIC | Age: 58
End: 2023-10-18
Payer: COMMERCIAL

## 2023-10-18 VITALS
HEIGHT: 62 IN | HEART RATE: 69 BPM | DIASTOLIC BLOOD PRESSURE: 80 MMHG | WEIGHT: 221.6 LBS | BODY MASS INDEX: 40.78 KG/M2 | OXYGEN SATURATION: 99 % | TEMPERATURE: 98 F | SYSTOLIC BLOOD PRESSURE: 140 MMHG

## 2023-10-18 DIAGNOSIS — Z11.59 NEED FOR HEPATITIS C SCREENING TEST: ICD-10-CM

## 2023-10-18 DIAGNOSIS — Z11.3 ROUTINE SCREENING FOR STI (SEXUALLY TRANSMITTED INFECTION): ICD-10-CM

## 2023-10-18 DIAGNOSIS — Z00.00 ENCOUNTER FOR HEALTH MAINTENANCE EXAMINATION IN ADULT: Primary | ICD-10-CM

## 2023-10-18 DIAGNOSIS — E66.01 OBESITY, CLASS III, BMI 40-49.9 (MORBID OBESITY) (HCC): ICD-10-CM

## 2023-10-18 DIAGNOSIS — R03.0 ELEVATED BLOOD PRESSURE READING: ICD-10-CM

## 2023-10-18 PROCEDURE — 99396 PREV VISIT EST AGE 40-64: CPT | Performed by: STUDENT IN AN ORGANIZED HEALTH CARE EDUCATION/TRAINING PROGRAM

## 2023-10-18 ASSESSMENT — PATIENT HEALTH QUESTIONNAIRE - PHQ9
2. FEELING DOWN, DEPRESSED OR HOPELESS: 0
SUM OF ALL RESPONSES TO PHQ QUESTIONS 1-9: 0
SUM OF ALL RESPONSES TO PHQ QUESTIONS 1-9: 0
SUM OF ALL RESPONSES TO PHQ9 QUESTIONS 1 & 2: 0
SUM OF ALL RESPONSES TO PHQ QUESTIONS 1-9: 0
SUM OF ALL RESPONSES TO PHQ QUESTIONS 1-9: 0
1. LITTLE INTEREST OR PLEASURE IN DOING THINGS: 0

## 2023-10-18 NOTE — PROGRESS NOTES
Luis Blaine presents today for   Chief Complaint   Patient presents with    New Patient     Former [place] patient        Is someone accompanying this pt? No     Is the patient using any DME equipment during OV? No     Health Maintenance reviewed and discussed and ordered per Provider. Health Maintenance Due   Topic Date Due    Hepatitis B vaccine (1 of 3 - 3-dose series) Never done    Depression Screen  Never done    HIV screen  Never done    Hepatitis C screen  Never done    DTaP/Tdap/Td vaccine (1 - Tdap) Never done    Cervical cancer screen  Never done    Diabetes screen  Never done    Lipids  Never done    Shingles vaccine (2 of 2) 01/28/2014    COVID-19 Vaccine (3 - Moderna series) 05/06/2021    Flu vaccine (1) 08/01/2023   . Coordination of Care:  1. \"Have you been to the ER, urgent care clinic since your last visit? Hospitalized since your last visit? \" No    2. \"Have you seen or consulted any other health care providers outside of the [address] [place] [address] since your last visit? \" No    3. For patients aged 43-73: Has the patient had a colonoscopy? Yes- No care gap present    If the patient is female:    4. For patients aged 43-66: Has the patient had a mammogram within the past 2 years? Yes- No care gap present    5. For patients aged 21-65: Has the patient had a pap smear? 14 years ago.

## 2023-10-20 ENCOUNTER — HOSPITAL ENCOUNTER (OUTPATIENT)
Age: 58
Discharge: HOME | End: 2023-10-20
Payer: COMMERCIAL

## 2023-10-20 DIAGNOSIS — E66.01 OBESITY, CLASS III, BMI 40-49.9 (MORBID OBESITY) (HCC): ICD-10-CM

## 2023-10-20 DIAGNOSIS — Z11.59 NEED FOR HEPATITIS C SCREENING TEST: ICD-10-CM

## 2023-10-20 DIAGNOSIS — Z11.3 ROUTINE SCREENING FOR STI (SEXUALLY TRANSMITTED INFECTION): ICD-10-CM

## 2023-10-20 DIAGNOSIS — Z00.00 ENCOUNTER FOR HEALTH MAINTENANCE EXAMINATION IN ADULT: ICD-10-CM

## 2023-10-20 LAB
ANION GAP SERPL CALC-SCNC: 6 MMOL/L (ref 3–18)
BUN SERPL-MCNC: 16 MG/DL (ref 7–18)
BUN/CREAT SERPL: 22 (ref 12–20)
CA-I BLD-MCNC: 8.9 MG/DL (ref 8.5–10.1)
CHLORIDE SERPL-SCNC: 112 MMOL/L (ref 100–111)
CHOLEST SERPL-MCNC: 230 MG/DL
CO2 SERPL-SCNC: 26 MMOL/L (ref 21–32)
CREAT SERPL-MCNC: 0.72 MG/DL (ref 0.6–1.3)
ERYTHROCYTE [DISTWIDTH] IN BLOOD BY AUTOMATED COUNT: 14.8 % (ref 11.6–14.5)
EST. AVERAGE GLUCOSE BLD GHB EST-MCNC: 120 MG/DL
GLUCOSE SERPL-MCNC: 88 MG/DL (ref 74–99)
HBA1C MFR BLD: 5.8 % (ref 4.2–5.6)
HCT VFR BLD AUTO: 36.4 % (ref 35–45)
HDLC SERPL-MCNC: 87 MG/DL (ref 40–60)
HDLC SERPL: 2.6 (ref 0–5)
HGB BLD-MCNC: 12.7 G/DL (ref 12–16)
LDLC SERPL CALC-MCNC: 104.2 MG/DL (ref 0–100)
LIPID PANEL: ABNORMAL
MCH RBC QN AUTO: 26.6 PG (ref 24–34)
MCHC RBC AUTO-ENTMCNC: 34.9 G/DL (ref 31–37)
MCV RBC AUTO: 76.3 FL (ref 78–100)
NRBC # BLD: 0 K/UL (ref 0–0.01)
NRBC BLD-RTO: 0 PER 100 WBC
PLATELET # BLD AUTO: 245 K/UL (ref 135–420)
PMV BLD AUTO: 11 FL (ref 9.2–11.8)
POTASSIUM SERPL-SCNC: 3.7 MMOL/L (ref 3.5–5.5)
RBC # BLD AUTO: 4.77 M/UL (ref 4.2–5.3)
SODIUM SERPL-SCNC: 144 MMOL/L (ref 136–145)
TRIGL SERPL-MCNC: 194 MG/DL
VLDLC SERPL CALC-MCNC: 38.8 MG/DL
WBC # BLD AUTO: 3.6 K/UL (ref 4.6–13.2)

## 2023-10-20 PROCEDURE — 85027 COMPLETE CBC AUTOMATED: CPT

## 2023-10-20 PROCEDURE — 87389 HIV-1 AG W/HIV-1&-2 AB AG IA: CPT

## 2023-10-20 PROCEDURE — 83036 HEMOGLOBIN GLYCOSYLATED A1C: CPT

## 2023-10-20 PROCEDURE — 80048 BASIC METABOLIC PNL TOTAL CA: CPT

## 2023-10-20 PROCEDURE — 36415 COLL VENOUS BLD VENIPUNCTURE: CPT

## 2023-10-20 PROCEDURE — 80061 LIPID PANEL: CPT

## 2023-10-20 PROCEDURE — 86803 HEPATITIS C AB TEST: CPT

## 2023-10-23 LAB
HCV AB SER IA-ACNC: 0.14 INDEX
HCV AB SERPL QL IA: NEGATIVE
HEPATITIS C COMMENT: NORMAL
HIV 1+2 AB+HIV1 P24 AG SERPL QL IA: NONREACTIVE
HIV 1/2 RESULT COMMENT: NORMAL

## 2023-10-25 ENCOUNTER — TELEMEDICINE (OUTPATIENT)
Facility: CLINIC | Age: 58
End: 2023-10-25
Payer: COMMERCIAL

## 2023-10-25 DIAGNOSIS — R73.03 PREDIABETES: Primary | ICD-10-CM

## 2023-10-25 DIAGNOSIS — J30.1 SEASONAL ALLERGIC RHINITIS DUE TO POLLEN: ICD-10-CM

## 2023-10-25 DIAGNOSIS — E66.01 OBESITY, CLASS III, BMI 40-49.9 (MORBID OBESITY) (HCC): ICD-10-CM

## 2023-10-25 PROCEDURE — 99441 PR PHYS/QHP TELEPHONE EVALUATION 5-10 MIN: CPT | Performed by: STUDENT IN AN ORGANIZED HEALTH CARE EDUCATION/TRAINING PROGRAM

## 2023-10-25 RX ORDER — HYDROCHLOROTHIAZIDE 12.5 MG/1
12.5 TABLET ORAL DAILY
COMMUNITY

## 2023-10-25 NOTE — PROGRESS NOTES
Jovanninitzalauri Beverly presents today for   Chief Complaint   Patient presents with    [facility] reviewed and discussed and ordered per Provider. Health Maintenance Due   Topic Date Due    Hepatitis B vaccine (1 of 3 - 3-dose series) Never done    DTaP/Tdap/Td vaccine (1 - Tdap) Never done    Cervical cancer screen  Never done    Shingles vaccine (2 of 2) 01/28/2014    COVID-19 Vaccine (3 - Moderna series) 05/06/2021    Flu vaccine (1) 08/01/2023   . Coordination of Care:  1. \"Have you been to the ER, urgent care clinic since your last visit? Hospitalized since your last visit? \" No    2. \"Have you seen or consulted any other health care providers outside of the [address] [place] [address] since your last visit? \" No     3. For patients aged 43-73: Has the patient had a colonoscopy? Yes- No care gap present    If the patient is female:    4. For patients aged 43-66: Has the patient had a mammogram within the past 2 years? Yes- No care gap present    5. For patients aged 21-65: Has the patient had a pap smear?  No

## 2023-10-25 NOTE — PROGRESS NOTES
Camilla Hines is a 59 y.o. female who was seen by synchronous (real-time) audio technology on 10/25/2023. Subjective: Camilla Hines is a 59 y.o. female who was seen for Discuss Labs    We reviewed blood work today. Her cholesterol was a little bit up but she was not fasting at that time. She also had hemoglobin A1c in the prediabetes range. There are things she can work on in her diet and she is working on weight loss. She checked on scale and calculate her BMI to be 37 yesterday with [employer] scale. In addition she is having a little bit more allergies. Was taking Mucinex D and Flonase nasal spray. Otherwise she is doing well without any issues    Prior to Admission medications    Medication Sig Start Date End Date Taking? Authorizing Provider   hydroCHLOROthiazide (HYDRODIURIL) 12.5 MG tablet Take 1 tablet by mouth daily   Yes Provider, MD Radha     No Known Allergies  Social History     Tobacco Use    Smoking status: Never    Smokeless tobacco: Never   Substance Use Topics    Alcohol use: No    Drug use: No        Review of systems:  All other systems are negative      Objective: There were no vitals taken for this visit. General: alert, cooperative, no distress   Mental  status: normal mood, behavior, speech, thought processes, able to follow commands   Psychiatric: normal affect, consistent with stated mood, no evidence of hallucinations       Assessment & Plan:     Prediabetes  Comments:  Hemoglobin A1c 5.8. Counseled on low-carb diet and exercise. Recheck in about 6 months  Seasonal allergic rhinitis due to pollen  Comments:  Recommend Flonase nasal spray and can use honey as a natural remedy  Obesity, Class III, BMI 40-49.9 (morbid obesity) (HCC)  Comments:  Recommend healthy diet to include healthy plate method and getting at least 150 minutes of moderate intensity activity a week.            We discussed the expected course, resolution and complications of the diagnosis(es)

## 2024-04-18 ENCOUNTER — OFFICE VISIT (OUTPATIENT)
Facility: CLINIC | Age: 59
End: 2024-04-18
Payer: COMMERCIAL

## 2024-04-18 VITALS
SYSTOLIC BLOOD PRESSURE: 113 MMHG | OXYGEN SATURATION: 100 % | HEIGHT: 62 IN | TEMPERATURE: 97.3 F | DIASTOLIC BLOOD PRESSURE: 77 MMHG | HEART RATE: 76 BPM | WEIGHT: 218.5 LBS | BODY MASS INDEX: 40.21 KG/M2

## 2024-04-18 DIAGNOSIS — E66.01 SEVERE OBESITY (BMI 35.0-39.9) WITH COMORBIDITY (HCC): ICD-10-CM

## 2024-04-18 DIAGNOSIS — I10 PRIMARY HYPERTENSION: ICD-10-CM

## 2024-04-18 DIAGNOSIS — R73.03 PREDIABETES: Primary | ICD-10-CM

## 2024-04-18 LAB — HBA1C MFR BLD: 5.5 %

## 2024-04-18 PROCEDURE — 3074F SYST BP LT 130 MM HG: CPT | Performed by: STUDENT IN AN ORGANIZED HEALTH CARE EDUCATION/TRAINING PROGRAM

## 2024-04-18 PROCEDURE — 83036 HEMOGLOBIN GLYCOSYLATED A1C: CPT | Performed by: STUDENT IN AN ORGANIZED HEALTH CARE EDUCATION/TRAINING PROGRAM

## 2024-04-18 PROCEDURE — 99214 OFFICE O/P EST MOD 30 MIN: CPT | Performed by: STUDENT IN AN ORGANIZED HEALTH CARE EDUCATION/TRAINING PROGRAM

## 2024-04-18 PROCEDURE — 3078F DIAST BP <80 MM HG: CPT | Performed by: STUDENT IN AN ORGANIZED HEALTH CARE EDUCATION/TRAINING PROGRAM

## 2024-04-18 RX ORDER — HYDROCHLOROTHIAZIDE 12.5 MG/1
12.5 TABLET ORAL DAILY
Qty: 90 TABLET | Refills: 3 | Status: SHIPPED | OUTPATIENT
Start: 2024-04-18

## 2024-04-18 RX ORDER — TRAZODONE HYDROCHLORIDE 50 MG/1
TABLET ORAL
COMMUNITY

## 2024-04-18 ASSESSMENT — PATIENT HEALTH QUESTIONNAIRE - PHQ9
SUM OF ALL RESPONSES TO PHQ QUESTIONS 1-9: 0
SUM OF ALL RESPONSES TO PHQ9 QUESTIONS 1 & 2: 0
2. FEELING DOWN, DEPRESSED OR HOPELESS: NOT AT ALL
SUM OF ALL RESPONSES TO PHQ9 QUESTIONS 1 & 2: 0
SUM OF ALL RESPONSES TO PHQ QUESTIONS 1-9: 0
2. FEELING DOWN, DEPRESSED OR HOPELESS: NOT AT ALL
SUM OF ALL RESPONSES TO PHQ QUESTIONS 1-9: 0
1. LITTLE INTEREST OR PLEASURE IN DOING THINGS: NOT AT ALL
SUM OF ALL RESPONSES TO PHQ QUESTIONS 1-9: 0
1. LITTLE INTEREST OR PLEASURE IN DOING THINGS: NOT AT ALL

## 2024-04-18 NOTE — PROGRESS NOTES
Maria Eugenia Oreilly presents today for   Chief Complaint   Patient presents with    Follow-up     6 mo follow up       Is someone accompanying this pt? no    Is the patient using any DME equipment during OV? no    Health Maintenance reviewed and discussed and ordered per Provider.    Health Maintenance Due   Topic Date Due    DTaP/Tdap/Td vaccine (1 - Tdap) Never done    Cervical cancer screen  Never done    Shingles vaccine (2 of 2) 01/28/2014    COVID-19 Vaccine (3 - 2023-24 season) 09/01/2023    Respiratory Syncytial Virus (RSV) Pregnant or age 60 yrs+ (1 - 1-dose 60+ series) Never done   .      \"Have you been to the ER, urgent care clinic since your last visit?  Hospitalized since your last visit?\"    NO    “Have you seen or consulted any other health care providers outside of [facility] since your last visit?”    NO        “Have you had a pap smear?”    NO    No cervical cancer screening on file

## 2024-04-18 NOTE — PROGRESS NOTES
Subjective:   Maria Eugenia Oreilly is a 58 y.o. 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  She is limited on what she can eat for breakfast lunch and dinner she works 14-hour days 3 days a week.  She is now allowed to bring food into the [employer] so has to eat from the vending machines.  She tries to eat healthier snacks using the protein nutrition reports.  She walks all day at work and tries to take frequent breaks to walk for exercise.  She does have a gym membership that she tries to use when she has her days off.  She has been checking her blood pressure and her blood pressure has been getting into the 140s systolic and she has been having headaches occasionally.  She been taking her blood pressure medication more consistently.  She does feel like it causes muscle cramp occasionally and causes her to feel more tired because her blood pressures low normal when she takes it.  Otherwise she has been doing well and is curious to see what her blood sugar is reading today.  She has been trying to eat more low-carb is much as possible.    Prior to Admission medications    Medication Sig Start Date End Date Taking? Authorizing Provider   traZODone (DESYREL) 50 MG tablet    Yes ProviderRadha MD   vitamin D (CHOLECALCIFEROL) 25 MCG (1000 UT) TABS tablet  4/18/16  Yes ProviderRadha MD   hydroCHLOROthiazide 12.5 MG tablet Take 1 tablet by mouth daily 4/18/24  Yes Salvador Fernandez MD     No Known Allergies  Social History     Tobacco Use    Smoking status: Never    Smokeless tobacco: Never   Substance Use Topics    Alcohol use: No    Drug use: Never        Review of Systems   As noted above in HPI.      Objective:   /77 (Site: Right Upper Arm, Position: Sitting, Cuff Size: Large Adult)   Pulse 76   Temp 97.3 °F (36.3 °C) (Temporal)   Ht 1.575 m (5' 2\")   Wt 99.1 kg (218 lb 8 oz)   SpO2 100%   BMI 39.96 kg/m²      General: alert,

## 2024-07-11 ENCOUNTER — OFFICE VISIT (OUTPATIENT)
Facility: CLINIC | Age: 59
End: 2024-07-11
Payer: COMMERCIAL

## 2024-07-11 VITALS
WEIGHT: 219.8 LBS | DIASTOLIC BLOOD PRESSURE: 80 MMHG | BODY MASS INDEX: 40.45 KG/M2 | RESPIRATION RATE: 18 BRPM | SYSTOLIC BLOOD PRESSURE: 133 MMHG | HEART RATE: 59 BPM | HEIGHT: 62 IN | TEMPERATURE: 97.2 F | OXYGEN SATURATION: 99 %

## 2024-07-11 DIAGNOSIS — E66.01 OBESITY, CLASS III, BMI 40-49.9 (MORBID OBESITY) (HCC): ICD-10-CM

## 2024-07-11 DIAGNOSIS — I10 PRIMARY HYPERTENSION: Primary | ICD-10-CM

## 2024-07-11 DIAGNOSIS — Z71.85 VACCINE COUNSELING: ICD-10-CM

## 2024-07-11 DIAGNOSIS — E78.2 MIXED HYPERLIPIDEMIA: ICD-10-CM

## 2024-07-11 PROBLEM — C50.919 PRIMARY MALIGNANT NEOPLASM OF FEMALE BREAST (HCC): Status: ACTIVE | Noted: 2024-07-11

## 2024-07-11 PROBLEM — C80.1 MALIGNANT NEOPLASM (HCC): Status: ACTIVE | Noted: 2024-07-11

## 2024-07-11 PROBLEM — D72.819 LEUKOPENIA: Status: ACTIVE | Noted: 2024-07-11

## 2024-07-11 PROBLEM — C80.1 MALIGNANT NEOPLASM (HCC): Status: RESOLVED | Noted: 2024-07-11 | Resolved: 2024-07-11

## 2024-07-11 PROBLEM — C50.919 PRIMARY MALIGNANT NEOPLASM OF FEMALE BREAST (HCC): Status: RESOLVED | Noted: 2024-07-11 | Resolved: 2024-07-11

## 2024-07-11 PROBLEM — R73.03 PREDIABETES: Status: ACTIVE | Noted: 2024-07-11

## 2024-07-11 PROCEDURE — 3079F DIAST BP 80-89 MM HG: CPT | Performed by: STUDENT IN AN ORGANIZED HEALTH CARE EDUCATION/TRAINING PROGRAM

## 2024-07-11 PROCEDURE — 99214 OFFICE O/P EST MOD 30 MIN: CPT | Performed by: STUDENT IN AN ORGANIZED HEALTH CARE EDUCATION/TRAINING PROGRAM

## 2024-07-11 PROCEDURE — 99401 PREV MED CNSL INDIV APPRX 15: CPT | Performed by: STUDENT IN AN ORGANIZED HEALTH CARE EDUCATION/TRAINING PROGRAM

## 2024-07-11 PROCEDURE — 3075F SYST BP GE 130 - 139MM HG: CPT | Performed by: STUDENT IN AN ORGANIZED HEALTH CARE EDUCATION/TRAINING PROGRAM

## 2024-07-11 NOTE — PROGRESS NOTES
Maria Eugenia Oreilly presents today for   Chief Complaint   Patient presents with    Other     Patient is here for Health Clearance.       Is someone accompanying this pt? no    Is the patient using any DME equipment during OV? no    Health Maintenance Due   Topic Date Due    DTaP/Tdap/Td vaccine (1 - Tdap) Never done    Cervical cancer screen  Never done    Shingles vaccine (2 of 2) 01/28/2014    COVID-19 Vaccine (3 - 2023-24 season) 09/01/2023    Respiratory Syncytial Virus (RSV) Pregnant or age 60 yrs+ (1 - 1-dose 60+ series) Never done       \"Have you been to the ER, urgent care clinic since your last visit?  Hospitalized since your last visit?\"    NO    “Have you seen or consulted any other health care providers outside of [facility] since your last visit?”    NO        “Have you had a pap smear?”    NO    No cervical cancer screening on file

## 2024-07-11 NOTE — PROGRESS NOTES
Subjective:   Maria Eugenia Oreilly is a 58 y.o. female who was seen for Other (Patient is here for Health Clearance.)    Patient needs health clearance form to go back to work.  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  Gets TB tested every year with the present.  She believes last time was in February.  She is due for some vaccines.  She will contact the health department or her occupational health to see which when she is actually due for.  Her blood pressure is well-controlled today.  Compliant with medications.    Prior to Admission medications    Medication Sig Start Date End Date Taking? Authorizing Provider   traZODone (DESYREL) 50 MG tablet    Yes Provider, MD Radha   vitamin D (CHOLECALCIFEROL) 25 MCG (1000 UT) TABS tablet  4/18/16  Yes ProviderRadha MD   hydroCHLOROthiazide 12.5 MG tablet Take 1 tablet by mouth daily 4/18/24  Yes Salvador Fernandez MD     No Known Allergies  Social History     Tobacco Use    Smoking status: Never    Smokeless tobacco: Never   Substance Use Topics    Alcohol use: No    Drug use: Never        Review of Systems   As noted above in HPI.      Objective:   /80 (Site: Right Upper Arm, Position: Sitting, Cuff Size: Large Adult)   Pulse 59   Temp 97.2 °F (36.2 °C) (Temporal)   Resp 18   Ht 1.575 m (5' 2\")   Wt 99.7 kg (219 lb 12.8 oz)   SpO2 99%   BMI 40.20 kg/m²      General: alert, oriented, not in distress  Chest/Lungs: clear breath sounds, no wheezing or crackles  Heart: normal rate, regular rhythm, no murmur  Extremities: no focal deformities, no edema  Skin: no active skin lesions      Assessment & Plan:     Primary hypertension  Mixed hyperlipidemia  Obesity, Class III, BMI 40-49.9 (morbid obesity) ([facility])  -     [facility] - Kim Diaz, CNP, Weight Loss, [place]  Vaccine counseling       Hypertension-controlled.  Continue HCTZ 12.5 mg daily.  Recommend DASH diet and weight loss    Obesity, BMI 40-has had sleeve gastrectomy in the past.  Would like to lose

## 2024-07-12 ENCOUNTER — TELEPHONE (OUTPATIENT)
Facility: CLINIC | Age: 59
End: 2024-07-12

## 2024-07-12 ENCOUNTER — PATIENT MESSAGE (OUTPATIENT)
Facility: CLINIC | Age: 59
End: 2024-07-12

## 2024-07-12 NOTE — TELEPHONE ENCOUNTER
Patient called stating she already took her weight lose injection. She does want to keep taking it. She said her pharmacy is [facility].  Patient stated it is ok to leave a voicemail.  Call back number is [phone]

## 2024-07-17 ENCOUNTER — TELEPHONE (OUTPATIENT)
Facility: CLINIC | Age: 59
End: 2024-07-17

## 2024-07-17 DIAGNOSIS — E66.01 SEVERE OBESITY (HCC): Primary | ICD-10-CM

## 2024-07-17 NOTE — TELEPHONE ENCOUNTER
Patient called stating she needs a prior authorization for her wegovy. She would like to be notified once it is sent it.    Call back number is [phone]

## 2024-07-24 ENCOUNTER — TELEPHONE (OUTPATIENT)
Facility: CLINIC | Age: 59
End: 2024-07-24

## 2024-07-24 NOTE — TELEPHONE ENCOUNTER
Patient called stating that to much information was put on the prior authorization. She is not able to get the wegovy because of this. She would like for it to be filled out again with less of her medical information on it.   She also stated that if she was unable to get the wegovy could mounjaro be put in instead.     Call back number is [phone]

## 2025-05-08 PROBLEM — E55.9 VITAMIN D DEFICIENCY: Status: ACTIVE | Noted: 2025-05-08

## 2025-05-08 NOTE — PROGRESS NOTES
Maria Eugenia Oreilly is a 59 y.o. female is seen on 5/9/2025 for Established New Doctor (Patient is here to establish care with provider. Former Jim patient.)      Assessment & Plan  Primary hypertension   Chronic, at goal (stable),  DASH diet. Exercise. Continue HCTZ 12.5mg once day    Orders:    Comprehensive Metabolic Panel; Future    CBC with Auto Differential; Future    hydroCHLOROthiazide 12.5 MG tablet; Take 1 tablet by mouth daily    Semaglutide-Weight Management (WEGOVY) 1 MG/0.5ML SOAJ SC injection; Inject 1 mg into the skin every 7 days    Acute upper respiratory infection   Acute condition, new, Encouraged fluids. Continue Coricidin HBP prn symptom relief.     Prediabetes   Chronic, at goal (stable), ADA diet. Continue weight loss efforts with Semaglutide. Labs per order  Orders:    Hemoglobin A1C; Future    Comprehensive Metabolic Panel; Future    Semaglutide-Weight Management (WEGOVY) 1 MG/0.5ML SOAJ SC injection; Inject 1 mg into the skin every 7 days    Mixed hyperlipidemia    Chronic, not at goal in 2023. Unsure why not on Statin. Further treatment pending lab results.  Orders:    Lipid Panel; Future    CBC with Auto Differential; Future    Semaglutide-Weight Management (WEGOVY) 1 MG/0.5ML SOAJ SC injection; Inject 1 mg into the skin every 7 days    History of breast cancer   Monitored by specialist- no acute findings meriting change in the plan. Follows yearly with Dr. [facility]    S/P bariatric surgery   Monitored by specialist- no acute findings meriting change in the plan      Vitamin D deficiency   Chronic, at goal (stable), Continue Vitamin D  1000IU daily. Lab per order  Orders:    Vitamin D 25 Hydroxy; Future    Other decreased white blood cell (WBC) count   Chronic, at goal (stable), lab as ordered. Monitored by Oncologist also.     Orders:    CBC with Auto Differential; Future    Encounter for long-term (current) use of medications  Long term use of Thiazide diuretic

## 2025-05-08 NOTE — ASSESSMENT & PLAN NOTE
Monitored by specialist- no acute findings meriting change in the plan. Follows yearly with Dr. [facility]

## 2025-05-08 NOTE — ASSESSMENT & PLAN NOTE
Chronic, at goal (stable), ADA diet. Continue weight loss efforts with Semaglutide. Labs per order  Orders:    Hemoglobin A1C; Future    Comprehensive Metabolic Panel; Future    Semaglutide-Weight Management (WEGOVY) 1 MG/0.5ML SOAJ SC injection; Inject 1 mg into the skin every 7 days

## 2025-05-08 NOTE — ASSESSMENT & PLAN NOTE
Chronic, at goal (stable), Continue Vitamin D 1000IU daily. Lab per order  Orders:    Vitamin D 25 Hydroxy; Future     Offered, feeding was successful

## 2025-05-08 NOTE — ASSESSMENT & PLAN NOTE
Chronic, not at goal in 2023. Unsure why not on Statin. Further treatment pending lab results.  Orders:    Lipid Panel; Future    CBC with Auto Differential; Future    Semaglutide-Weight Management (WEGOVY) 1 MG/0.5ML SOAJ SC injection; Inject 1 mg into the skin every 7 days

## 2025-05-09 ENCOUNTER — HOSPITAL ENCOUNTER (OUTPATIENT)
Age: 60
Discharge: HOME | End: 2025-05-12
Payer: COMMERCIAL

## 2025-05-09 ENCOUNTER — OFFICE VISIT (OUTPATIENT)
Facility: CLINIC | Age: 60
End: 2025-05-09
Payer: COMMERCIAL

## 2025-05-09 ENCOUNTER — RESULTS FOLLOW-UP (OUTPATIENT)
Facility: CLINIC | Age: 60
End: 2025-05-09

## 2025-05-09 VITALS
OXYGEN SATURATION: 98 % | TEMPERATURE: 98.2 F | HEART RATE: 65 BPM | HEIGHT: 62 IN | DIASTOLIC BLOOD PRESSURE: 86 MMHG | WEIGHT: 199.2 LBS | SYSTOLIC BLOOD PRESSURE: 139 MMHG | BODY MASS INDEX: 36.66 KG/M2 | RESPIRATION RATE: 18 BRPM

## 2025-05-09 DIAGNOSIS — E78.2 MIXED HYPERLIPIDEMIA: ICD-10-CM

## 2025-05-09 DIAGNOSIS — Z98.84 S/P BARIATRIC SURGERY: ICD-10-CM

## 2025-05-09 DIAGNOSIS — E55.9 VITAMIN D DEFICIENCY: ICD-10-CM

## 2025-05-09 DIAGNOSIS — Z71.3 DIETARY COUNSELING AND SURVEILLANCE: ICD-10-CM

## 2025-05-09 DIAGNOSIS — Z79.899 ENCOUNTER FOR LONG-TERM (CURRENT) USE OF MEDICATIONS: ICD-10-CM

## 2025-05-09 DIAGNOSIS — I10 PRIMARY HYPERTENSION: Primary | ICD-10-CM

## 2025-05-09 DIAGNOSIS — J06.9 ACUTE UPPER RESPIRATORY INFECTION: ICD-10-CM

## 2025-05-09 DIAGNOSIS — Z85.3 HISTORY OF BREAST CANCER: ICD-10-CM

## 2025-05-09 DIAGNOSIS — R73.03 PREDIABETES: ICD-10-CM

## 2025-05-09 DIAGNOSIS — I10 PRIMARY HYPERTENSION: ICD-10-CM

## 2025-05-09 DIAGNOSIS — D72.818 OTHER DECREASED WHITE BLOOD CELL (WBC) COUNT: ICD-10-CM

## 2025-05-09 LAB
25(OH)D3 SERPL-MCNC: 24.6 NG/ML (ref 30–100)
ALBUMIN SERPL-MCNC: 3.5 G/DL (ref 3.4–5)
ALBUMIN/GLOB SERPL: 1.1 (ref 0.8–1.7)
ALP SERPL-CCNC: 79 U/L (ref 45–117)
ALT SERPL-CCNC: 23 U/L (ref 13–56)
ANION GAP SERPL CALC-SCNC: 2 MMOL/L (ref 3–18)
AST SERPL W P-5'-P-CCNC: 13 U/L (ref 10–38)
BASOPHILS # BLD: 0 K/UL (ref 0–0.1)
BASOPHILS NFR BLD: 0 % (ref 0–2)
BILIRUB SERPL-MCNC: 0.5 MG/DL (ref 0.2–1)
BUN SERPL-MCNC: 9 MG/DL (ref 7–18)
BUN/CREAT SERPL: 16 (ref 12–20)
CA-I BLD-MCNC: 8.8 MG/DL (ref 8.5–10.1)
CHLORIDE SERPL-SCNC: 108 MMOL/L (ref 100–111)
CHOLEST SERPL-MCNC: 234 MG/DL
CO2 SERPL-SCNC: 29 MMOL/L (ref 21–32)
CREAT SERPL-MCNC: 0.58 MG/DL (ref 0.6–1.3)
DIFFERENTIAL METHOD BLD: ABNORMAL
EOSINOPHIL # BLD: 0 K/UL (ref 0–0.4)
EOSINOPHIL NFR BLD: 0 % (ref 0–5)
ERYTHROCYTE [DISTWIDTH] IN BLOOD BY AUTOMATED COUNT: 14.6 % (ref 11.6–14.5)
EST. AVERAGE GLUCOSE BLD GHB EST-MCNC: 109 MG/DL
GLOBULIN SER CALC-MCNC: 3.3 G/DL (ref 2–4)
GLUCOSE SERPL-MCNC: 83 MG/DL (ref 74–99)
HBA1C MFR BLD: 5.4 % (ref 4.2–5.6)
HCT VFR BLD AUTO: 36.2 % (ref 35–45)
HDLC SERPL-MCNC: 82 MG/DL (ref 40–60)
HDLC SERPL: 2.8 (ref 0–5)
HGB BLD-MCNC: 12.2 G/DL (ref 12–16)
IMM GRANULOCYTES # BLD AUTO: 0 K/UL
IMM GRANULOCYTES NFR BLD AUTO: 0 %
LDLC SERPL CALC-MCNC: 141 MG/DL (ref 0–100)
LYMPHOCYTES # BLD: 1.65 K/UL (ref 0.9–3.6)
LYMPHOCYTES NFR BLD: 57 % (ref 21–52)
MAGNESIUM SERPL-MCNC: 1.9 MG/DL (ref 1.6–2.6)
MCH RBC QN AUTO: 25.7 PG (ref 24–34)
MCHC RBC AUTO-ENTMCNC: 33.7 G/DL (ref 31–37)
MCV RBC AUTO: 76.2 FL (ref 78–100)
MONOCYTES # BLD: 0.32 K/UL (ref 0.05–1.2)
MONOCYTES NFR BLD: 11 % (ref 3–10)
NEUTS SEG # BLD: 0.93 K/UL (ref 1.8–8)
NEUTS SEG NFR BLD: 32 % (ref 40–73)
NRBC # BLD: 0 K/UL (ref 0–0.01)
NRBC BLD-RTO: 0 PER 100 WBC
PLATELET # BLD AUTO: 181 K/UL (ref 135–420)
PMV BLD AUTO: 10.2 FL (ref 9.2–11.8)
POTASSIUM SERPL-SCNC: 3.8 MMOL/L (ref 3.5–5.5)
PROT SERPL-MCNC: 6.8 G/DL (ref 6.4–8.2)
RBC # BLD AUTO: 4.75 M/UL (ref 4.2–5.3)
RBC MORPH BLD: ABNORMAL
SODIUM SERPL-SCNC: 139 MMOL/L (ref 136–145)
TRIGL SERPL-MCNC: 51 MG/DL (ref 0–150)
VLDLC SERPL CALC-MCNC: 10 MG/DL
WBC # BLD AUTO: 2.9 K/UL (ref 4.6–13.2)

## 2025-05-09 PROCEDURE — 82306 VITAMIN D 25 HYDROXY: CPT

## 2025-05-09 PROCEDURE — 80053 COMPREHEN METABOLIC PANEL: CPT

## 2025-05-09 PROCEDURE — 3079F DIAST BP 80-89 MM HG: CPT | Performed by: FAMILY MEDICINE

## 2025-05-09 PROCEDURE — 83036 HEMOGLOBIN GLYCOSYLATED A1C: CPT

## 2025-05-09 PROCEDURE — 83735 ASSAY OF MAGNESIUM: CPT

## 2025-05-09 PROCEDURE — 36415 COLL VENOUS BLD VENIPUNCTURE: CPT

## 2025-05-09 PROCEDURE — 3075F SYST BP GE 130 - 139MM HG: CPT | Performed by: FAMILY MEDICINE

## 2025-05-09 PROCEDURE — 99214 OFFICE O/P EST MOD 30 MIN: CPT | Performed by: FAMILY MEDICINE

## 2025-05-09 PROCEDURE — 80061 LIPID PANEL: CPT

## 2025-05-09 PROCEDURE — 85025 COMPLETE CBC W/AUTO DIFF WBC: CPT

## 2025-05-09 RX ORDER — HYDROCHLOROTHIAZIDE 12.5 MG/1
12.5 TABLET ORAL DAILY
Qty: 90 TABLET | Refills: 1 | Status: SHIPPED | OUTPATIENT
Start: 2025-05-09

## 2025-05-09 SDOH — ECONOMIC STABILITY: FOOD INSECURITY: WITHIN THE PAST 12 MONTHS, YOU WORRIED THAT YOUR FOOD WOULD RUN OUT BEFORE YOU GOT MONEY TO BUY MORE.: SOMETIMES TRUE

## 2025-05-09 SDOH — ECONOMIC STABILITY: FOOD INSECURITY: WITHIN THE PAST 12 MONTHS, THE FOOD YOU BOUGHT JUST DIDN'T LAST AND YOU DIDN'T HAVE MONEY TO GET MORE.: NEVER TRUE

## 2025-05-09 ASSESSMENT — ENCOUNTER SYMPTOMS
EYES NEGATIVE: 1
WHEEZING: 0
DIARRHEA: 0
VOMITING: 0
SORE THROAT: 0
NAUSEA: 0
CONSTIPATION: 0
ABDOMINAL PAIN: 0
SHORTNESS OF BREATH: 0
COUGH: 1
BLOOD IN STOOL: 0
VOICE CHANGE: 0

## 2025-05-09 ASSESSMENT — PATIENT HEALTH QUESTIONNAIRE - PHQ9
SUM OF ALL RESPONSES TO PHQ QUESTIONS 1-9: 0
SUM OF ALL RESPONSES TO PHQ QUESTIONS 1-9: 0
2. FEELING DOWN, DEPRESSED OR HOPELESS: NOT AT ALL
SUM OF ALL RESPONSES TO PHQ QUESTIONS 1-9: 0
SUM OF ALL RESPONSES TO PHQ QUESTIONS 1-9: 0
1. LITTLE INTEREST OR PLEASURE IN DOING THINGS: NOT AT ALL

## 2025-05-09 NOTE — ASSESSMENT & PLAN NOTE
Chronic, at goal (stable), lab as ordered. Monitored by Oncologist also.     Orders:    CBC with Auto Differential; Future

## 2025-05-09 NOTE — PROGRESS NOTES
Maria Eugenia Oreilly presents today for   Chief Complaint   Patient presents with    Established New Doctor     Patient is here to establish care with provider. Former [facility] patient.       Is someone accompanying this pt? no    Is the patient using any DME equipment during OV? no    Health Maintenance Due   Topic Date Due    Polio vaccine (2 of 3 - 4-dose series) 09/25/1979    DTaP/Tdap/Td vaccine (2 - Tdap) 12/12/1981    Cervical cancer screen  Never done    Pneumococcal 50+ years Vaccine (1 of 1 - PCV) Never done    Shingles vaccine (2 of 2) 01/28/2014    Respiratory Syncytial Virus (RSV) Pregnant or age 60 yrs+ (1 - Risk 60-74 years 1-dose series) Never done    COVID-19 Vaccine (3 - 2024-25 season) 09/01/2024    A1C test (Diabetic or Prediabetic)  04/18/2025    Depression Screen  04/18/2025         \"Have you been to the ER, urgent care clinic since your last visit?  Hospitalized since your last visit?\"    NO    “Have you seen or consulted any other health care providers outside our system since your last visit?”    NO     “Have you had a pap smear?”    NO    No cervical cancer screening on file

## 2025-05-12 NOTE — TELEPHONE ENCOUNTER
Called patient to speak about message from Heather Hardin NP. Confirmed name and date of birth. Spoke with patient about message. Patient expressed understanding.

## 2025-05-12 NOTE — TELEPHONE ENCOUNTER
----- Message from WING Caraballo sent at 5/12/2025  1:11 PM EDT -----  Call patient: Kidneys, liver good. A1C better 5.4. LDL cholesterol is elevated 141 but HDL (good cholesterol) outweighs bad so pattern is good. Triglycerides good. Vitamin D is borderline low 24.6 Make sure taking daily Vitamin D supplement. White count is low-she has a history of this. Follow up with hematology/oncology as scheduled.

## 2025-05-12 NOTE — RESULT ENCOUNTER NOTE
Call patient: Kidneys, liver good. A1C better 5.4. LDL cholesterol is elevated 141 but HDL (good cholesterol) outweighs bad so pattern is good. Triglycerides good. Vitamin D is borderline low 24.6 Make sure taking daily Vitamin D supplement. White count is low-she has a history of this. Follow up with hematology/oncology as scheduled.

## 2025-07-14 ENCOUNTER — TELEPHONE (OUTPATIENT)
Facility: CLINIC | Age: 60
End: 2025-07-14

## 2025-07-14 NOTE — TELEPHONE ENCOUNTER
Patient called in regards to a letter she received on 7/7 about the approval for Wegovy and Zepbound. She stated that the provider was suppose to receive the letter as well. Wanted to know which medication she is suppose to be on and if the prescription could be sent to the pharmacy. Patient requesting a call back if letter has been received and once prescription is sent.